# Patient Record
Sex: FEMALE | Race: WHITE | Employment: OTHER | URBAN - NONMETROPOLITAN AREA
[De-identification: names, ages, dates, MRNs, and addresses within clinical notes are randomized per-mention and may not be internally consistent; named-entity substitution may affect disease eponyms.]

---

## 2020-10-16 PROBLEM — J44.9 COPD (CHRONIC OBSTRUCTIVE PULMONARY DISEASE) (HCC): Status: ACTIVE | Noted: 2020-10-16

## 2020-10-16 PROBLEM — R73.09 HEMOGLOBIN A1C ABOVE REFERENCE RANGE: Status: ACTIVE | Noted: 2020-10-16

## 2020-10-16 PROBLEM — G25.0 ESSENTIAL TREMOR: Status: ACTIVE | Noted: 2020-10-16

## 2020-10-16 PROBLEM — R16.0 LIVER MASS: Status: ACTIVE | Noted: 2020-10-16

## 2020-10-16 PROBLEM — I10 HTN (HYPERTENSION): Status: ACTIVE | Noted: 2020-10-16

## 2020-10-16 PROBLEM — F32.A DEPRESSION: Status: ACTIVE | Noted: 2020-10-16

## 2020-10-16 PROBLEM — E78.5 HLD (HYPERLIPIDEMIA): Status: ACTIVE | Noted: 2020-10-16

## 2020-10-16 PROBLEM — Z79.899 OTHER LONG TERM (CURRENT) DRUG THERAPY: Status: ACTIVE | Noted: 2020-10-16

## 2020-10-16 PROBLEM — F41.9 ANXIETY: Status: ACTIVE | Noted: 2020-10-16

## 2020-10-16 PROBLEM — G43.109 OPHTHALMIC MIGRAINE: Status: ACTIVE | Noted: 2020-10-16

## 2020-10-16 PROBLEM — F17.200 NICOTINE DEPENDENCE: Status: ACTIVE | Noted: 2020-10-16

## 2021-08-17 PROBLEM — S32.592A CLOSED BILATERAL FRACTURE OF PUBIC RAMI (HCC): Status: ACTIVE | Noted: 2021-08-17

## 2021-08-17 PROBLEM — K92.2 GI BLEED: Status: ACTIVE | Noted: 2021-08-17

## 2021-08-17 PROBLEM — S32.591A CLOSED BILATERAL FRACTURE OF PUBIC RAMI (HCC): Status: ACTIVE | Noted: 2021-08-17

## 2021-08-17 PROBLEM — E87.6 HYPOKALEMIA: Status: ACTIVE | Noted: 2021-08-17

## 2021-08-17 PROBLEM — E87.1 HYPONATREMIA: Status: ACTIVE | Noted: 2021-08-17

## 2021-08-20 PROBLEM — R53.81 PHYSICAL DECONDITIONING: Status: ACTIVE | Noted: 2021-08-20

## 2021-08-20 PROBLEM — S72.144A CLOSED NONDISPLACED INTERTROCHANTERIC FRACTURE OF RIGHT FEMUR (HCC): Status: ACTIVE | Noted: 2021-08-20

## 2021-08-20 PROBLEM — R52 ACUTE PAIN: Status: ACTIVE | Noted: 2021-08-20

## 2021-08-20 PROBLEM — A04.72 C. DIFFICILE DIARRHEA: Status: ACTIVE | Noted: 2021-08-20

## 2021-08-20 PROBLEM — M84.48XA BILATERAL SACRAL INSUFFICIENCY FRACTURE: Status: ACTIVE | Noted: 2021-08-20

## 2021-08-20 PROBLEM — S72.141A INTERTROCHANTERIC FRACTURE OF RIGHT FEMUR, CLOSED, INITIAL ENCOUNTER (HCC): Status: ACTIVE | Noted: 2021-08-20

## 2021-10-06 PROBLEM — M54.50 LUMBAR PAIN: Status: ACTIVE | Noted: 2021-10-06

## 2021-10-06 PROBLEM — M47.817 LUMBOSACRAL SPONDYLOSIS WITHOUT MYELOPATHY: Status: ACTIVE | Noted: 2021-10-06

## 2021-10-06 PROBLEM — M47.816 LUMBAR SPONDYLOSIS: Status: ACTIVE | Noted: 2021-10-06

## 2021-10-06 PROBLEM — M51.369 DDD (DEGENERATIVE DISC DISEASE), LUMBAR: Status: ACTIVE | Noted: 2021-10-06

## 2021-10-06 PROBLEM — M51.36 DDD (DEGENERATIVE DISC DISEASE), LUMBAR: Status: ACTIVE | Noted: 2021-10-06

## 2022-01-13 PROBLEM — S32.10XA SACRAL FRACTURE (HCC): Status: ACTIVE | Noted: 2022-01-13

## 2024-01-26 PROBLEM — R47.81 SLURRED SPEECH: Status: ACTIVE | Noted: 2024-01-26

## 2024-01-26 PROBLEM — R44.3 HALLUCINATION: Status: ACTIVE | Noted: 2024-01-26

## 2024-01-26 PROBLEM — J18.9 COMMUNITY ACQUIRED PNEUMONIA: Status: ACTIVE | Noted: 2024-01-26

## 2024-01-26 PROBLEM — E86.0 DEHYDRATION: Status: ACTIVE | Noted: 2024-01-26

## 2024-02-25 PROBLEM — E86.0 DEHYDRATION: Status: RESOLVED | Noted: 2024-01-26 | Resolved: 2024-02-25

## 2024-08-16 PROBLEM — M25.551 ACUTE RIGHT HIP PAIN: Status: ACTIVE | Noted: 2024-08-16

## 2024-08-26 ENCOUNTER — HOSPITAL ENCOUNTER (OUTPATIENT)
Dept: PREADMISSION TESTING | Age: 74
Discharge: HOME | End: 2024-08-30
Attending: ORTHOPAEDIC SURGERY | Admitting: ORTHOPAEDIC SURGERY

## 2024-08-26 VITALS
WEIGHT: 141 LBS | SYSTOLIC BLOOD PRESSURE: 137 MMHG | TEMPERATURE: 97.4 F | OXYGEN SATURATION: 95 % | RESPIRATION RATE: 16 BRPM | DIASTOLIC BLOOD PRESSURE: 81 MMHG | HEIGHT: 63 IN | HEART RATE: 68 BPM | BODY MASS INDEX: 24.98 KG/M2

## 2024-08-26 RX ORDER — ACETAMINOPHEN/DIPHENHYDRAMINE 500MG-25MG
1 TABLET ORAL NIGHTLY PRN
COMMUNITY

## 2024-08-26 RX ORDER — NAPROXEN SODIUM 220 MG
220 TABLET ORAL DAILY PRN
COMMUNITY

## 2024-08-26 ASSESSMENT — PAIN DESCRIPTION - ORIENTATION: ORIENTATION: RIGHT

## 2024-08-26 ASSESSMENT — PAIN DESCRIPTION - PAIN TYPE: TYPE: CHRONIC PAIN

## 2024-08-26 ASSESSMENT — PAIN SCALES - GENERAL: PAINLEVEL_OUTOF10: 5

## 2024-08-26 ASSESSMENT — PAIN DESCRIPTION - LOCATION: LOCATION: HIP

## 2024-08-26 NOTE — PROGRESS NOTES
Patient arrived for PAT visit. Patient was cleared by her pcp, with the request of a second EKG. Patient stated that she had her 2nd EKG and was good. I called Humza Stout's office, spoke to her staff. Staff stated that it appeared to have been read by cardiology and it was okay to proceed. However the EKG was not final. I request Anesthesia review the completed EKG, they did and they saw a new Atrial futter vs atrial fib and requested cardiac clearance. Connie at Dr. Jarvis's office notified via .

## 2024-08-26 NOTE — PROGRESS NOTES
[facility]   Preadmission Testing    Name: Radha Gusman  : 1950  Patient Phone: [phone] (home) [phone] (work)    Procedure: Right ANNALEE   Date of Procedure: 24  Surgeon: Vic Jarvis MD    Ht:  63\"  Wt: 141#  Wt method: stated    Allergies:   Allergies   Allergen Reactions    Gabapentin Other (See Comments)     Has to have Neurotin name brand; reaction affects brain, auras    Aspirin Other (See Comments)     Burning stomach    Lovastatin Myalgia    Pravastatin Myalgia    Tricor [Fenofibrate] Myalgia    Spiriva Handihaler [Tiotropium Bromide Monohydrate]        Peanut allergy: No         Vitals:    24 1319   BP: 137/81   Pulse: 68   Resp: 16   Temp: 97.4 °F (36.3 °C)   SpO2: 95%       No LMP recorded.    Do you take blood thinners?   [x] Yes    [] No         Instructed to stop blood thinners prior to procedure?    [x] Yes    [] No      [] N/A   Do you have sleep apnea?   [] Yes    [x] No     Instructed to bring CPAP machine?   [] Yes    [] No    [x] N/A   Do you have acid reflux ?   [] Yes    [x] No     Do you have  hiatal hernia?   [] Yes    [x] No    Do you ever experience motion sickness?   [x] Yes    [] No     Have you had a respiratory infection or sore throat in last 4 weeks before surgery?    [x] Yes    [] No     Do you have poorly controlled asthma or COPD?   [] Yes    [x] No     Do you have a history of angina in the last month or symptomatic arrhythmia?   [] Yes    [x] No     Do you have significant central nervous system disease?   [x] Yes    [] No     Have you had an EKG, labs, or chest xray in last 12 months?  If yes provide copies to anesthesia   [x] Yes    [] No       [x] Lab    [x] EKG    [x] CXR     Have you had a stress test?     [] Yes    [x] No    When/where:    Was it normal?    [] Yes    [] No   Do you or your family have a history of Malignant Hyperthermia? [] Yes    [x] No     Patient instructed on:   [x] NPO Status   [x] Meds to Take Day of Surgery  [x]

## 2024-11-19 NOTE — PROGRESS NOTES
Patient was notified about her type and screen needing redrawn for her surgery on 11/21. The patient will be in [place] tomorrow so she will come 11/20 to have that done.

## 2024-11-20 ENCOUNTER — HOSPITAL ENCOUNTER (OUTPATIENT)
Age: 74
Discharge: HOME | End: 2024-11-20
Payer: MEDICARE

## 2024-11-20 ENCOUNTER — ANESTHESIA EVENT (OUTPATIENT)
Dept: OPERATING ROOM | Age: 74
End: 2024-11-20
Payer: MEDICARE

## 2024-11-20 LAB
ABO + RH BLD: NORMAL
ARM BAND NUMBER: NORMAL
BLOOD BANK SAMPLE EXPIRATION: NORMAL
BLOOD GROUP ANTIBODIES SERPL: NEGATIVE

## 2024-11-20 PROCEDURE — 36415 COLL VENOUS BLD VENIPUNCTURE: CPT

## 2024-11-20 PROCEDURE — 86850 RBC ANTIBODY SCREEN: CPT

## 2024-11-20 PROCEDURE — 86900 BLOOD TYPING SEROLOGIC ABO: CPT

## 2024-11-20 PROCEDURE — 86901 BLOOD TYPING SEROLOGIC RH(D): CPT

## 2024-11-21 ENCOUNTER — HOSPITAL ENCOUNTER (OUTPATIENT)
Age: 74
Setting detail: OBSERVATION
Discharge: HOME | End: 2024-11-22
Attending: ORTHOPAEDIC SURGERY | Admitting: ORTHOPAEDIC SURGERY
Payer: MEDICARE

## 2024-11-21 ENCOUNTER — APPOINTMENT (OUTPATIENT)
Dept: GENERAL RADIOLOGY | Age: 74
End: 2024-11-21
Attending: ORTHOPAEDIC SURGERY
Payer: MEDICARE

## 2024-11-21 ENCOUNTER — ANESTHESIA (OUTPATIENT)
Dept: OPERATING ROOM | Age: 74
End: 2024-11-21
Payer: MEDICARE

## 2024-11-21 DIAGNOSIS — Z01.818 PRE-OP TESTING: ICD-10-CM

## 2024-11-21 DIAGNOSIS — Z96.641 S/P TOTAL RIGHT HIP ARTHROPLASTY: Primary | ICD-10-CM

## 2024-11-21 PROCEDURE — 7100000001 HC PACU RECOVERY - ADDTL 15 MIN: Performed by: ORTHOPAEDIC SURGERY

## 2024-11-21 PROCEDURE — 3700000000 HC ANESTHESIA ATTENDED CARE: Performed by: ORTHOPAEDIC SURGERY

## 2024-11-21 PROCEDURE — 97162 PT EVAL MOD COMPLEX 30 MIN: CPT

## 2024-11-21 PROCEDURE — 2500000003 HC RX 250 WO HCPCS: Performed by: ORTHOPAEDIC SURGERY

## 2024-11-21 PROCEDURE — 94640 AIRWAY INHALATION TREATMENT: CPT

## 2024-11-21 PROCEDURE — 94150 VITAL CAPACITY TEST: CPT

## 2024-11-21 PROCEDURE — 2700000000 HC OXYGEN THERAPY PER DAY

## 2024-11-21 PROCEDURE — 6360000002 HC RX W HCPCS: Performed by: PHYSICIAN ASSISTANT

## 2024-11-21 PROCEDURE — 6370000000 HC RX 637 (ALT 250 FOR IP): Performed by: INTERNAL MEDICINE

## 2024-11-21 PROCEDURE — 3700000001 HC ADD 15 MINUTES (ANESTHESIA): Performed by: ORTHOPAEDIC SURGERY

## 2024-11-21 PROCEDURE — 3600000005 HC SURGERY LEVEL 5 BASE: Performed by: ORTHOPAEDIC SURGERY

## 2024-11-21 PROCEDURE — 2580000003 HC RX 258: Performed by: NURSE ANESTHETIST, CERTIFIED REGISTERED

## 2024-11-21 PROCEDURE — 2580000003 HC RX 258: Performed by: PHYSICIAN ASSISTANT

## 2024-11-21 PROCEDURE — 72170 X-RAY EXAM OF PELVIS: CPT

## 2024-11-21 PROCEDURE — 6360000002 HC RX W HCPCS: Performed by: NURSE ANESTHETIST, CERTIFIED REGISTERED

## 2024-11-21 PROCEDURE — C1776 JOINT DEVICE (IMPLANTABLE): HCPCS | Performed by: ORTHOPAEDIC SURGERY

## 2024-11-21 PROCEDURE — 6370000000 HC RX 637 (ALT 250 FOR IP): Performed by: NURSE ANESTHETIST, CERTIFIED REGISTERED

## 2024-11-21 PROCEDURE — 6360000002 HC RX W HCPCS

## 2024-11-21 PROCEDURE — 6370000000 HC RX 637 (ALT 250 FOR IP): Performed by: PHYSICIAN ASSISTANT

## 2024-11-21 PROCEDURE — 3600000015 HC SURGERY LEVEL 5 ADDTL 15MIN: Performed by: ORTHOPAEDIC SURGERY

## 2024-11-21 PROCEDURE — G0378 HOSPITAL OBSERVATION PER HR: HCPCS

## 2024-11-21 PROCEDURE — 2709999900 HC NON-CHARGEABLE SUPPLY: Performed by: ORTHOPAEDIC SURGERY

## 2024-11-21 PROCEDURE — 94761 N-INVAS EAR/PLS OXIMETRY MLT: CPT

## 2024-11-21 PROCEDURE — 97166 OT EVAL MOD COMPLEX 45 MIN: CPT

## 2024-11-21 PROCEDURE — 7100000000 HC PACU RECOVERY - FIRST 15 MIN: Performed by: ORTHOPAEDIC SURGERY

## 2024-11-21 PROCEDURE — 2500000003 HC RX 250 WO HCPCS: Performed by: NURSE ANESTHETIST, CERTIFIED REGISTERED

## 2024-11-21 PROCEDURE — 94664 DEMO&/EVAL PT USE INHALER: CPT

## 2024-11-21 DEVICE — BONE SCREW 6.5X25 SELF-TAP: Type: IMPLANTABLE DEVICE | Site: HIP | Status: FUNCTIONAL

## 2024-11-21 DEVICE — ECHO B-MTRC MP FP SO 13: Type: IMPLANTABLE DEVICE | Site: HIP | Status: FUNCTIONAL

## 2024-11-21 DEVICE — IMPLANTABLE DEVICE: Type: IMPLANTABLE DEVICE | Site: HIP | Status: FUNCTIONAL

## 2024-11-21 DEVICE — G7 PPS LTD ACET SHELL 48C: Type: IMPLANTABLE DEVICE | Site: HIP | Status: FUNCTIONAL

## 2024-11-21 DEVICE — LINER ACET C NEUT 32 MM HIP VIVACIT-E G7: Type: IMPLANTABLE DEVICE | Site: HIP | Status: FUNCTIONAL

## 2024-11-21 RX ORDER — AMLODIPINE BESYLATE 5 MG/1
5 TABLET ORAL DAILY
Status: DISCONTINUED | OUTPATIENT
Start: 2024-11-21 | End: 2024-11-22 | Stop reason: HOSPADM

## 2024-11-21 RX ORDER — BUPIVACAINE/KETOROLAC/KETAMINE 150-60/50
SYRINGE (ML) INJECTION
Status: DISPENSED
Start: 2024-11-21 | End: 2024-11-21

## 2024-11-21 RX ORDER — NALOXONE HYDROCHLORIDE 0.4 MG/ML
INJECTION, SOLUTION INTRAMUSCULAR; INTRAVENOUS; SUBCUTANEOUS PRN
Status: DISCONTINUED | OUTPATIENT
Start: 2024-11-21 | End: 2024-11-21 | Stop reason: HOSPADM

## 2024-11-21 RX ORDER — ATENOLOL 50 MG/1
50 TABLET ORAL NIGHTLY
Status: DISCONTINUED | OUTPATIENT
Start: 2024-11-21 | End: 2024-11-22 | Stop reason: HOSPADM

## 2024-11-21 RX ORDER — LORAZEPAM 1 MG/1
1 TABLET ORAL EVERY 6 HOURS PRN
Status: DISCONTINUED | OUTPATIENT
Start: 2024-11-21 | End: 2024-11-22 | Stop reason: HOSPADM

## 2024-11-21 RX ORDER — HYDROMORPHONE HYDROCHLORIDE 1 MG/ML
0.5 INJECTION, SOLUTION INTRAMUSCULAR; INTRAVENOUS; SUBCUTANEOUS ONCE
Status: COMPLETED | OUTPATIENT
Start: 2024-11-21 | End: 2024-11-21

## 2024-11-21 RX ORDER — CEFAZOLIN SODIUM/WATER 2 G/20 ML
2000 SYRINGE (ML) INTRAVENOUS
Status: COMPLETED | OUTPATIENT
Start: 2024-11-21 | End: 2024-11-21

## 2024-11-21 RX ORDER — SODIUM CHLORIDE 9 MG/ML
INJECTION, SOLUTION INTRAVENOUS CONTINUOUS
Status: DISCONTINUED | OUTPATIENT
Start: 2024-11-21 | End: 2024-11-21 | Stop reason: HOSPADM

## 2024-11-21 RX ORDER — POLYETHYLENE GLYCOL 3350 17 G/17G
17 POWDER, FOR SOLUTION ORAL DAILY
Status: DISCONTINUED | OUTPATIENT
Start: 2024-11-21 | End: 2024-11-22 | Stop reason: HOSPADM

## 2024-11-21 RX ORDER — SODIUM CHLORIDE 9 MG/ML
INJECTION, SOLUTION INTRAVENOUS PRN
Status: DISCONTINUED | OUTPATIENT
Start: 2024-11-21 | End: 2024-11-21 | Stop reason: HOSPADM

## 2024-11-21 RX ORDER — EPHEDRINE SULFATE/0.9% NACL/PF 25 MG/5 ML
SYRINGE (ML) INTRAVENOUS
Status: DISCONTINUED | OUTPATIENT
Start: 2024-11-21 | End: 2024-11-21 | Stop reason: SDUPTHER

## 2024-11-21 RX ORDER — BUPIVACAINE/KETOROLAC/KETAMINE 150-60/50
SYRINGE (ML) INJECTION PRN
Status: DISCONTINUED | OUTPATIENT
Start: 2024-11-21 | End: 2024-11-21 | Stop reason: ALTCHOICE

## 2024-11-21 RX ORDER — ONDANSETRON 2 MG/ML
4 INJECTION INTRAMUSCULAR; INTRAVENOUS
Status: DISCONTINUED | OUTPATIENT
Start: 2024-11-21 | End: 2024-11-21 | Stop reason: HOSPADM

## 2024-11-21 RX ORDER — SENNOSIDES A AND B 8.6 MG/1
1 TABLET, FILM COATED ORAL DAILY PRN
Status: DISCONTINUED | OUTPATIENT
Start: 2024-11-21 | End: 2024-11-22 | Stop reason: HOSPADM

## 2024-11-21 RX ORDER — SODIUM CHLORIDE 9 MG/ML
INJECTION, SOLUTION INTRAVENOUS CONTINUOUS
Status: DISCONTINUED | OUTPATIENT
Start: 2024-11-21 | End: 2024-11-22 | Stop reason: HOSPADM

## 2024-11-21 RX ORDER — PHENYLEPHRINE HYDROCHLORIDE 10 MG/ML
INJECTION INTRAVENOUS
Status: DISCONTINUED | OUTPATIENT
Start: 2024-11-21 | End: 2024-11-21 | Stop reason: SDUPTHER

## 2024-11-21 RX ORDER — ONDANSETRON 4 MG/1
4 TABLET, ORALLY DISINTEGRATING ORAL EVERY 8 HOURS PRN
Status: DISCONTINUED | OUTPATIENT
Start: 2024-11-21 | End: 2024-11-22 | Stop reason: HOSPADM

## 2024-11-21 RX ORDER — SODIUM CHLORIDE 0.9 % (FLUSH) 0.9 %
5-40 SYRINGE (ML) INJECTION EVERY 12 HOURS SCHEDULED
Status: DISCONTINUED | OUTPATIENT
Start: 2024-11-21 | End: 2024-11-21 | Stop reason: HOSPADM

## 2024-11-21 RX ORDER — DIPHENHYDRAMINE HCL 25 MG
25 CAPSULE ORAL EVERY 6 HOURS PRN
Status: DISCONTINUED | OUTPATIENT
Start: 2024-11-21 | End: 2024-11-22 | Stop reason: HOSPADM

## 2024-11-21 RX ORDER — TRANEXAMIC ACID 100 MG/ML
1000 INJECTION, SOLUTION INTRAVENOUS ONCE
Status: COMPLETED | OUTPATIENT
Start: 2024-11-21 | End: 2024-11-21

## 2024-11-21 RX ORDER — FENTANYL CITRATE 50 UG/ML
INJECTION, SOLUTION INTRAMUSCULAR; INTRAVENOUS
Status: DISCONTINUED | OUTPATIENT
Start: 2024-11-21 | End: 2024-11-21 | Stop reason: SDUPTHER

## 2024-11-21 RX ORDER — ATENOLOL 50 MG/1
100 TABLET ORAL DAILY
Status: DISCONTINUED | OUTPATIENT
Start: 2024-11-22 | End: 2024-11-22 | Stop reason: HOSPADM

## 2024-11-21 RX ORDER — DIMENHYDRINATE 50 MG
50 TABLET ORAL ONCE
Status: COMPLETED | OUTPATIENT
Start: 2024-11-21 | End: 2024-11-21

## 2024-11-21 RX ORDER — ONDANSETRON 2 MG/ML
4 INJECTION INTRAMUSCULAR; INTRAVENOUS EVERY 6 HOURS PRN
Status: DISCONTINUED | OUTPATIENT
Start: 2024-11-21 | End: 2024-11-22 | Stop reason: HOSPADM

## 2024-11-21 RX ORDER — ACETAMINOPHEN 325 MG/1
650 TABLET ORAL ONCE
Status: DISCONTINUED | OUTPATIENT
Start: 2024-11-21 | End: 2024-11-21 | Stop reason: HOSPADM

## 2024-11-21 RX ORDER — ALBUTEROL SULFATE 90 UG/1
2 INHALANT RESPIRATORY (INHALATION) EVERY 4 HOURS PRN
Status: DISCONTINUED | OUTPATIENT
Start: 2024-11-21 | End: 2024-11-22 | Stop reason: HOSPADM

## 2024-11-21 RX ORDER — TRANEXAMIC ACID 100 MG/ML
1000 INJECTION, SOLUTION INTRAVENOUS ONCE
Status: DISCONTINUED | OUTPATIENT
Start: 2024-11-21 | End: 2024-11-22 | Stop reason: HOSPADM

## 2024-11-21 RX ORDER — GABAPENTIN 300 MG/1
300 CAPSULE ORAL 4 TIMES DAILY
Status: DISCONTINUED | OUTPATIENT
Start: 2024-11-21 | End: 2024-11-22 | Stop reason: HOSPADM

## 2024-11-21 RX ORDER — ALBUTEROL SULFATE 0.83 MG/ML
2.5 SOLUTION RESPIRATORY (INHALATION) ONCE
Status: COMPLETED | OUTPATIENT
Start: 2024-11-21 | End: 2024-11-21

## 2024-11-21 RX ORDER — VENLAFAXINE HYDROCHLORIDE 150 MG/1
150 CAPSULE, EXTENDED RELEASE ORAL 2 TIMES DAILY
Status: DISCONTINUED | OUTPATIENT
Start: 2024-11-21 | End: 2024-11-22 | Stop reason: HOSPADM

## 2024-11-21 RX ORDER — POTASSIUM CHLORIDE 750 MG/1
10 TABLET, EXTENDED RELEASE ORAL DAILY
Status: DISCONTINUED | OUTPATIENT
Start: 2024-11-21 | End: 2024-11-22 | Stop reason: HOSPADM

## 2024-11-21 RX ORDER — BISACODYL 5 MG/1
5 TABLET, DELAYED RELEASE ORAL DAILY PRN
Status: DISCONTINUED | OUTPATIENT
Start: 2024-11-21 | End: 2024-11-22 | Stop reason: HOSPADM

## 2024-11-21 RX ORDER — SODIUM CHLORIDE 0.9 % (FLUSH) 0.9 %
5-40 SYRINGE (ML) INJECTION EVERY 12 HOURS SCHEDULED
Status: DISCONTINUED | OUTPATIENT
Start: 2024-11-21 | End: 2024-11-22 | Stop reason: HOSPADM

## 2024-11-21 RX ORDER — SODIUM CHLORIDE 0.9 % (FLUSH) 0.9 %
5-40 SYRINGE (ML) INJECTION PRN
Status: DISCONTINUED | OUTPATIENT
Start: 2024-11-21 | End: 2024-11-21 | Stop reason: HOSPADM

## 2024-11-21 RX ORDER — SODIUM CHLORIDE 9 MG/ML
INJECTION, SOLUTION INTRAVENOUS PRN
Status: DISCONTINUED | OUTPATIENT
Start: 2024-11-21 | End: 2024-11-22 | Stop reason: HOSPADM

## 2024-11-21 RX ORDER — DEXMEDETOMIDINE HYDROCHLORIDE 4 UG/ML
INJECTION, SOLUTION INTRAVENOUS
Status: DISCONTINUED | OUTPATIENT
Start: 2024-11-21 | End: 2024-11-21 | Stop reason: SDUPTHER

## 2024-11-21 RX ORDER — ALBUTEROL SULFATE 90 UG/1
2 INHALANT RESPIRATORY (INHALATION) EVERY 6 HOURS PRN
Status: DISCONTINUED | OUTPATIENT
Start: 2024-11-21 | End: 2024-11-21

## 2024-11-21 RX ORDER — ACETAMINOPHEN 325 MG/1
650 TABLET ORAL EVERY 6 HOURS
Status: DISCONTINUED | OUTPATIENT
Start: 2024-11-21 | End: 2024-11-22 | Stop reason: HOSPADM

## 2024-11-21 RX ORDER — ACETAMINOPHEN 500 MG
1000 TABLET ORAL ONCE
Status: COMPLETED | OUTPATIENT
Start: 2024-11-21 | End: 2024-11-21

## 2024-11-21 RX ORDER — ALBUTEROL SULFATE 90 UG/1
2 INHALANT RESPIRATORY (INHALATION)
Status: DISCONTINUED | OUTPATIENT
Start: 2024-11-21 | End: 2024-11-22 | Stop reason: HOSPADM

## 2024-11-21 RX ORDER — FENTANYL CITRATE 50 UG/ML
50 INJECTION, SOLUTION INTRAMUSCULAR; INTRAVENOUS EVERY 5 MIN PRN
Status: COMPLETED | OUTPATIENT
Start: 2024-11-21 | End: 2024-11-21

## 2024-11-21 RX ORDER — OXYCODONE HYDROCHLORIDE 5 MG/1
5 TABLET ORAL EVERY 4 HOURS PRN
Status: DISCONTINUED | OUTPATIENT
Start: 2024-11-21 | End: 2024-11-22 | Stop reason: HOSPADM

## 2024-11-21 RX ORDER — CEFAZOLIN SODIUM/WATER 2 G/20 ML
2000 SYRINGE (ML) INTRAVENOUS EVERY 8 HOURS
Status: COMPLETED | OUTPATIENT
Start: 2024-11-21 | End: 2024-11-22

## 2024-11-21 RX ORDER — DEXAMETHASONE SODIUM PHOSPHATE 4 MG/ML
INJECTION, SOLUTION INTRA-ARTICULAR; INTRALESIONAL; INTRAMUSCULAR; INTRAVENOUS; SOFT TISSUE
Status: DISCONTINUED | OUTPATIENT
Start: 2024-11-21 | End: 2024-11-21 | Stop reason: SDUPTHER

## 2024-11-21 RX ORDER — LIDOCAINE HYDROCHLORIDE 20 MG/ML
INJECTION, SOLUTION EPIDURAL; INFILTRATION; INTRACAUDAL; PERINEURAL
Status: DISCONTINUED | OUTPATIENT
Start: 2024-11-21 | End: 2024-11-21 | Stop reason: SDUPTHER

## 2024-11-21 RX ORDER — OXYCODONE HYDROCHLORIDE 5 MG/1
5 TABLET ORAL EVERY 6 HOURS PRN
Qty: 20 TABLET | Refills: 0 | Status: SHIPPED | OUTPATIENT
Start: 2024-11-21 | End: 2024-11-26

## 2024-11-21 RX ORDER — ROCURONIUM BROMIDE 10 MG/ML
INJECTION, SOLUTION INTRAVENOUS
Status: DISCONTINUED | OUTPATIENT
Start: 2024-11-21 | End: 2024-11-21 | Stop reason: SDUPTHER

## 2024-11-21 RX ORDER — EZETIMIBE 10 MG/1
10 TABLET ORAL NIGHTLY
Status: DISCONTINUED | OUTPATIENT
Start: 2024-11-21 | End: 2024-11-22 | Stop reason: HOSPADM

## 2024-11-21 RX ORDER — SODIUM CHLORIDE 0.9 % (FLUSH) 0.9 %
5-40 SYRINGE (ML) INJECTION PRN
Status: DISCONTINUED | OUTPATIENT
Start: 2024-11-21 | End: 2024-11-22 | Stop reason: HOSPADM

## 2024-11-21 RX ORDER — DIPHENHYDRAMINE HYDROCHLORIDE 50 MG/ML
25 INJECTION INTRAMUSCULAR; INTRAVENOUS EVERY 6 HOURS PRN
Status: DISCONTINUED | OUTPATIENT
Start: 2024-11-21 | End: 2024-11-22 | Stop reason: HOSPADM

## 2024-11-21 RX ORDER — OXYCODONE HYDROCHLORIDE 5 MG/1
10 TABLET ORAL EVERY 4 HOURS PRN
Status: DISCONTINUED | OUTPATIENT
Start: 2024-11-21 | End: 2024-11-22 | Stop reason: HOSPADM

## 2024-11-21 RX ORDER — ONDANSETRON 2 MG/ML
INJECTION INTRAMUSCULAR; INTRAVENOUS
Status: DISCONTINUED | OUTPATIENT
Start: 2024-11-21 | End: 2024-11-21 | Stop reason: SDUPTHER

## 2024-11-21 RX ORDER — PROPOFOL 10 MG/ML
INJECTION, EMULSION INTRAVENOUS
Status: DISCONTINUED | OUTPATIENT
Start: 2024-11-21 | End: 2024-11-21 | Stop reason: SDUPTHER

## 2024-11-21 RX ORDER — FENTANYL CITRATE 50 UG/ML
25 INJECTION, SOLUTION INTRAMUSCULAR; INTRAVENOUS EVERY 5 MIN PRN
Status: DISCONTINUED | OUTPATIENT
Start: 2024-11-21 | End: 2024-11-21 | Stop reason: HOSPADM

## 2024-11-21 RX ORDER — KETOROLAC TROMETHAMINE 15 MG/ML
15 INJECTION, SOLUTION INTRAMUSCULAR; INTRAVENOUS ONCE
Status: COMPLETED | OUTPATIENT
Start: 2024-11-21 | End: 2024-11-21

## 2024-11-21 RX ADMIN — ROCURONIUM BROMIDE 10 MG: 10 INJECTION, SOLUTION INTRAVENOUS at 08:02

## 2024-11-21 RX ADMIN — KETOROLAC TROMETHAMINE 15 MG: 15 INJECTION, SOLUTION INTRAMUSCULAR; INTRAVENOUS at 10:47

## 2024-11-21 RX ADMIN — SODIUM CHLORIDE: 9 INJECTION, SOLUTION INTRAVENOUS at 13:17

## 2024-11-21 RX ADMIN — LIDOCAINE HYDROCHLORIDE 80 MG: 20 INJECTION, SOLUTION EPIDURAL; INFILTRATION; INTRACAUDAL; PERINEURAL at 07:41

## 2024-11-21 RX ADMIN — DEXMEDETOMIDINE HYDROCHLORIDE IN 0.9% SODIUM CHLORIDE 2 MCG: 4 INJECTION INTRAVENOUS at 09:05

## 2024-11-21 RX ADMIN — LISINOPRIL 30 MG: 20 TABLET ORAL at 13:01

## 2024-11-21 RX ADMIN — ATENOLOL 50 MG: 50 TABLET ORAL at 20:07

## 2024-11-21 RX ADMIN — SUGAMMADEX 150 MG: 100 INJECTION, SOLUTION INTRAVENOUS at 09:50

## 2024-11-21 RX ADMIN — PROPOFOL 110 MG: 10 INJECTION, EMULSION INTRAVENOUS at 07:41

## 2024-11-21 RX ADMIN — PHENYLEPHRINE HYDROCHLORIDE 100 MCG: 10 INJECTION INTRAVENOUS at 09:39

## 2024-11-21 RX ADMIN — VENLAFAXINE HYDROCHLORIDE 150 MG: 150 CAPSULE, EXTENDED RELEASE ORAL at 20:07

## 2024-11-21 RX ADMIN — EZETIMIBE 10 MG: 10 TABLET ORAL at 20:07

## 2024-11-21 RX ADMIN — FENTANYL CITRATE 25 MCG: 0.05 INJECTION, SOLUTION INTRAMUSCULAR; INTRAVENOUS at 10:51

## 2024-11-21 RX ADMIN — ROCURONIUM BROMIDE 10 MG: 10 INJECTION, SOLUTION INTRAVENOUS at 08:13

## 2024-11-21 RX ADMIN — OXYCODONE HYDROCHLORIDE 10 MG: 5 TABLET ORAL at 23:08

## 2024-11-21 RX ADMIN — PHENYLEPHRINE HYDROCHLORIDE 100 MCG: 10 INJECTION INTRAVENOUS at 08:44

## 2024-11-21 RX ADMIN — HYDROMORPHONE HYDROCHLORIDE 0.5 MG: 1 INJECTION, SOLUTION INTRAMUSCULAR; INTRAVENOUS; SUBCUTANEOUS at 11:11

## 2024-11-21 RX ADMIN — Medication 1000 MG: at 07:57

## 2024-11-21 RX ADMIN — ROCURONIUM BROMIDE 30 MG: 10 INJECTION, SOLUTION INTRAVENOUS at 07:41

## 2024-11-21 RX ADMIN — ALBUTEROL SULFATE 2.5 MG: 0.83 SOLUTION RESPIRATORY (INHALATION) at 07:20

## 2024-11-21 RX ADMIN — FENTANYL CITRATE 50 MCG: 50 INJECTION INTRAMUSCULAR; INTRAVENOUS at 07:41

## 2024-11-21 RX ADMIN — AMLODIPINE BESYLATE 5 MG: 5 TABLET ORAL at 13:01

## 2024-11-21 RX ADMIN — DEXAMETHASONE SODIUM PHOSPHATE 4 MG: 4 INJECTION INTRA-ARTICULAR; INTRALESIONAL; INTRAMUSCULAR; INTRAVENOUS; SOFT TISSUE at 07:59

## 2024-11-21 RX ADMIN — FENTANYL CITRATE 50 MCG: 50 INJECTION INTRAMUSCULAR; INTRAVENOUS at 08:25

## 2024-11-21 RX ADMIN — DEXMEDETOMIDINE HYDROCHLORIDE IN 0.9% SODIUM CHLORIDE 2 MCG: 4 INJECTION INTRAVENOUS at 08:30

## 2024-11-21 RX ADMIN — PHENYLEPHRINE HYDROCHLORIDE 100 MCG: 10 INJECTION INTRAVENOUS at 08:00

## 2024-11-21 RX ADMIN — ROCURONIUM BROMIDE 10 MG: 10 INJECTION, SOLUTION INTRAVENOUS at 09:10

## 2024-11-21 RX ADMIN — PHENYLEPHRINE HYDROCHLORIDE 100 MCG: 10 INJECTION INTRAVENOUS at 08:40

## 2024-11-21 RX ADMIN — EPHEDRINE SULFATE 5 MG: 5 INJECTION INTRAVENOUS at 08:17

## 2024-11-21 RX ADMIN — ACETAMINOPHEN 1000 MG: 500 TABLET ORAL at 06:53

## 2024-11-21 RX ADMIN — EPHEDRINE SULFATE 10 MG: 5 INJECTION INTRAVENOUS at 07:56

## 2024-11-21 RX ADMIN — DEXMEDETOMIDINE HYDROCHLORIDE IN 0.9% SODIUM CHLORIDE 2 MCG: 4 INJECTION INTRAVENOUS at 08:56

## 2024-11-21 RX ADMIN — ALBUTEROL SULFATE 2 PUFF: 90 AEROSOL, METERED RESPIRATORY (INHALATION) at 16:26

## 2024-11-21 RX ADMIN — POTASSIUM CHLORIDE 10 MEQ: 750 TABLET, EXTENDED RELEASE ORAL at 13:01

## 2024-11-21 RX ADMIN — POLYETHYLENE GLYCOL 3350 17 G: 17 POWDER, FOR SOLUTION ORAL at 13:00

## 2024-11-21 RX ADMIN — PHENYLEPHRINE HYDROCHLORIDE 100 MCG: 10 INJECTION INTRAVENOUS at 09:31

## 2024-11-21 RX ADMIN — PHENYLEPHRINE HYDROCHLORIDE 100 MCG: 10 INJECTION INTRAVENOUS at 09:22

## 2024-11-21 RX ADMIN — TRANEXAMIC ACID 1000 MG: 100 INJECTION, SOLUTION INTRAVENOUS at 09:29

## 2024-11-21 RX ADMIN — GABAPENTIN 300 MG: 300 CAPSULE ORAL at 16:39

## 2024-11-21 RX ADMIN — ACETAMINOPHEN 650 MG: 325 TABLET ORAL at 13:01

## 2024-11-21 RX ADMIN — OXYCODONE HYDROCHLORIDE 10 MG: 5 TABLET ORAL at 13:01

## 2024-11-21 RX ADMIN — SODIUM CHLORIDE: 9 INJECTION, SOLUTION INTRAVENOUS at 09:12

## 2024-11-21 RX ADMIN — ROCURONIUM BROMIDE 10 MG: 10 INJECTION, SOLUTION INTRAVENOUS at 08:34

## 2024-11-21 RX ADMIN — FENTANYL CITRATE 50 MCG: 0.05 INJECTION, SOLUTION INTRAMUSCULAR; INTRAVENOUS at 10:18

## 2024-11-21 RX ADMIN — ALBUTEROL SULFATE 2 PUFF: 90 AEROSOL, METERED RESPIRATORY (INHALATION) at 20:25

## 2024-11-21 RX ADMIN — PHENYLEPHRINE HYDROCHLORIDE 100 MCG: 10 INJECTION INTRAVENOUS at 08:17

## 2024-11-21 RX ADMIN — FENTANYL CITRATE 50 MCG: 0.05 INJECTION, SOLUTION INTRAMUSCULAR; INTRAVENOUS at 10:28

## 2024-11-21 RX ADMIN — DEXMEDETOMIDINE HYDROCHLORIDE IN 0.9% SODIUM CHLORIDE 4 MCG: 4 INJECTION INTRAVENOUS at 09:51

## 2024-11-21 RX ADMIN — SODIUM CHLORIDE: 9 INJECTION, SOLUTION INTRAVENOUS at 07:34

## 2024-11-21 RX ADMIN — GABAPENTIN 300 MG: 300 CAPSULE ORAL at 20:09

## 2024-11-21 RX ADMIN — ACETAMINOPHEN 650 MG: 325 TABLET ORAL at 20:07

## 2024-11-21 RX ADMIN — EPHEDRINE SULFATE 10 MG: 5 INJECTION INTRAVENOUS at 07:59

## 2024-11-21 RX ADMIN — DIMENHYDRINATE 50 MG: 50 TABLET ORAL at 06:53

## 2024-11-21 RX ADMIN — APIXABAN 2.5 MG: 2.5 TABLET, FILM COATED ORAL at 22:59

## 2024-11-21 RX ADMIN — GABAPENTIN 300 MG: 300 CAPSULE ORAL at 13:03

## 2024-11-21 RX ADMIN — Medication 2000 MG: at 16:38

## 2024-11-21 RX ADMIN — Medication 2000 MG: at 23:00

## 2024-11-21 RX ADMIN — TRANEXAMIC ACID 1000 MG: 100 INJECTION, SOLUTION INTRAVENOUS at 07:49

## 2024-11-21 RX ADMIN — ONDANSETRON 4 MG: 2 INJECTION, SOLUTION INTRAMUSCULAR; INTRAVENOUS at 09:31

## 2024-11-21 ASSESSMENT — PAIN DESCRIPTION - LOCATION
LOCATION: HIP

## 2024-11-21 ASSESSMENT — PAIN DESCRIPTION - FREQUENCY
FREQUENCY: CONTINUOUS

## 2024-11-21 ASSESSMENT — PAIN DESCRIPTION - DESCRIPTORS
DESCRIPTORS: SHARP
DESCRIPTORS: ACHING
DESCRIPTORS: SHARP
DESCRIPTORS: ACHING;DISCOMFORT
DESCRIPTORS: STABBING
DESCRIPTORS: ACHING;SHOOTING
DESCRIPTORS: ACHING
DESCRIPTORS: ACHING;SHOOTING

## 2024-11-21 ASSESSMENT — PAIN SCALES - GENERAL
PAINLEVEL_OUTOF10: 4
PAINLEVEL_OUTOF10: 10
PAINLEVEL_OUTOF10: 0
PAINLEVEL_OUTOF10: 10
PAINLEVEL_OUTOF10: 8
PAINLEVEL_OUTOF10: 0
PAINLEVEL_OUTOF10: 10
PAINLEVEL_OUTOF10: 7
PAINLEVEL_OUTOF10: 10
PAINLEVEL_OUTOF10: 10
PAINLEVEL_OUTOF10: 7
PAINLEVEL_OUTOF10: 10
PAINLEVEL_OUTOF10: 10

## 2024-11-21 ASSESSMENT — PAIN DESCRIPTION - ORIENTATION
ORIENTATION: RIGHT

## 2024-11-21 ASSESSMENT — PAIN DESCRIPTION - PAIN TYPE
TYPE: SURGICAL PAIN
TYPE: SURGICAL PAIN
TYPE: CHRONIC PAIN
TYPE: SURGICAL PAIN

## 2024-11-21 ASSESSMENT — PAIN - FUNCTIONAL ASSESSMENT
PAIN_FUNCTIONAL_ASSESSMENT: ACTIVITIES ARE NOT PREVENTED
PAIN_FUNCTIONAL_ASSESSMENT: PREVENTS OR INTERFERES SOME ACTIVE ACTIVITIES AND ADLS
PAIN_FUNCTIONAL_ASSESSMENT: PREVENTS OR INTERFERES SOME ACTIVE ACTIVITIES AND ADLS
PAIN_FUNCTIONAL_ASSESSMENT: 0-10
PAIN_FUNCTIONAL_ASSESSMENT: ACTIVITIES ARE NOT PREVENTED
PAIN_FUNCTIONAL_ASSESSMENT: 0-10
PAIN_FUNCTIONAL_ASSESSMENT: FACE, LEGS, ACTIVITY, CRY, AND CONSOLABILITY (FLACC)
PAIN_FUNCTIONAL_ASSESSMENT: PREVENTS OR INTERFERES SOME ACTIVE ACTIVITIES AND ADLS
PAIN_FUNCTIONAL_ASSESSMENT: 0-10
PAIN_FUNCTIONAL_ASSESSMENT: 0-10
PAIN_FUNCTIONAL_ASSESSMENT: FACE, LEGS, ACTIVITY, CRY, AND CONSOLABILITY (FLACC)
PAIN_FUNCTIONAL_ASSESSMENT: 0-10
PAIN_FUNCTIONAL_ASSESSMENT: ACTIVITIES ARE NOT PREVENTED
PAIN_FUNCTIONAL_ASSESSMENT: 0-10

## 2024-11-21 ASSESSMENT — PAIN DESCRIPTION - ONSET
ONSET: ON-GOING

## 2024-11-21 ASSESSMENT — LIFESTYLE VARIABLES: SMOKING_STATUS: 1

## 2024-11-21 ASSESSMENT — COPD QUESTIONNAIRES: CAT_SEVERITY: MILD

## 2024-11-21 NOTE — OP NOTE
Department of Orthopaedic Surgery  Operative Report      Patient:  Radha Gusman    YOB: 1950    MRN:  [id number]    Date of Surgery:  11/21/2024    Pre-operative Diagnosis:    1) Right hip osteoarthritis  2) Right hip pain     Post-operative Diagnosis:    1) Right hip osteoarthritis  2) Right hip pain     Procedure: Right total hip arthroplasty     Surgeon:  Vic Jarvis MD    Assistant(s):   First Assistant: Hui Sloan PA-C, assisted throughout the procedure with positioning, draping, retraction, wound closure, and dressing application.    Anesthesia: General     Estimated blood loss:  550 ml     Specimens:  Femoral head and acetabulum reamings disposed of     Fluids: 1500 ml crystalloid     Findings:  Bone on bone osteoarthritis, central wear.     Complications: None     Condition:  Stable     Indications for Surgery:  Patient is a 73 y/o female who presented with continued right hip pain with ambulation despite nonoperative interventions. Pain was affecting her quality of life and ADLs. Radiographs demonstrated end-stage osteoarthritis of the right hip. Given patient's continued pain, discussion was had with patient regarding total hip arthroplasty.  Discussed procedure, risks, benefits, and alternatives including but not limited to bleeding, infection, neurovascular injury, hardware failure, dislocation, leg length discrepancy, fracture, continued pain, DVT, need for additional surgery and risk of anesthesia. Patient understood the risks and consented to proceed with surgery.       Implants:    Biomet Echo micro 13 standard offset femur  48 mm G7 acetabular shell  Neutral liner  32mm x -3mm head ceramic  6.5 mm screw x2     Procedure:  Patient was identified and greeted in the preoperative holding area. The correct surgical site was identified and marked. Surgical consent was obtained and placed on the chart. Patient was taken to the surgical suite and transferred to the operating room table.

## 2024-11-21 NOTE — DISCHARGE INSTRUCTIONS
Total Joint Replacement  Discharge Instructions    To prevent Clot formation, you have been placed on an anticoagulant  Surgical Site Care:  Dressing to remain clean, dry, and intact until follow up in office in 2 weeks  Sutures may be present and will likely be removed at 2 week follow up   Showering is permitted over dressing as long as dressing remains fully intact. Do no shower if dressing is peeling or compromised  Physical Therapy:  Full Weight Bearing Status  Precautions   Per Physical Therapy handout  Pain Medications  You were given pain medication  Wean off pain medications as you deem appropriate as long as pain is under control  Cold packs/Ice packs/Machine  May be used 3 times daily for 15-30 minutes as necessary  Be sure to have a barrier (cloth, clothing, towel) between the site and the ice pack to prevent frostbite  Contact [facility] office if  Increased redness, swelling, drainage of any kind, and/or pain to surgery site.  As well as new onset fevers and or chills.  These could signify an infection.  Calf or thigh tenderness to touch as well as increased swelling or redness.  This could signify a clot formation.  Numbness or tingling to an area around the incision site or below the incision site (toes).  Any rash appears, increased  or new onset nausea/vomiting occur.  This may indicate a reaction to a medication.   Phone # [phone] - [facility]  Follow up with Surgeon at scheduled appointment time.

## 2024-11-21 NOTE — PROGRESS NOTES
Pt resting more comfortably after second dose of IV fentanyl but when she wakes up, she states pain is unchanged, 10/10, appears uncomfortable. Spoke to KIM Rausch who states we can try 15mg IV toradol and another 25mcg IV fentanyl.

## 2024-11-21 NOTE — PROGRESS NOTES
Pt continues to state pain unchanged, 10/10, still appears uncomfortable, Miracle, CRNA at bedside to discuss, orders 0.5mg IV dilaudid.

## 2024-11-21 NOTE — PROGRESS NOTES
Physical Therapy  Facility/Department: [facility]  Physical Therapy Initial Assessment    Name: Radha Gusman  : 1950  MRN: [id number]  Date of Service: 2024    Discharge Recommendations:  Continue to assess pending progress, Outpatient PT, Home with assist PRN          Patient Diagnosis(es): The primary encounter diagnosis was S/P total right hip arthroplasty. A diagnosis of Pre-op testing was also pertinent to this visit.  Past Medical History:  has a past medical history of Anxiety, C. difficile diarrhea, COPD (chronic obstructive pulmonary disease) (HCC), Depression, Essential tremor, Hemoglobin A1c above reference range, HLD (hyperlipidemia), Hypertension, Liver mass, Nicotine dependence, and Ophthalmic migraine.  Past Surgical History:  has a past surgical history that includes Breast surgery; Appendectomy; Hysterectomy, vaginal; Pain management procedure (Left, 2021); and Total hip arthroplasty (Right, 2024).    Assessment  Assessment: Patient is 74 year old female s/p R ANNALEE who presents with decreased R LE strength, decreased functional mobility and endurance and decreased safety and balance during transfers and ambulation and would benefit from physical therapy to address all concerns and safely return to OF.  Treatment Diagnosis: Difficulty walking  Specific Instructions for Next Treatment: Once daily on weekends  Therapy Prognosis: Good  Decision Making: Medium Complexity  Requires PT Follow-Up: Yes  Activity Tolerance  Activity Tolerance: Patient tolerated evaluation without incident    Plan  Physical Therapy Plan  General Plan: 2 times a day 7 days a week  Specific Instructions for Next Treatment: Once daily on weekends  Current Treatment Recommendations: Strengthening, ROM, Balance training, Functional mobility training, Transfer training, Neuromuscular re-education, Gait training, Stair training, Home exercise program, Safety education & training, Patient/Caregiver

## 2024-11-21 NOTE — PROGRESS NOTES
RESPIRATORY ASSESSMENT PROTOCOL                                                                                              Patient Name: Radha Gusman Room#: 0329/0329-01 : 1950     Admitting diagnosis: Right hip pain [M25.551]  S/P total right hip arthroplasty [Z96.641]       Medical History:   Past Medical History:   Diagnosis Date    Anxiety     C. difficile diarrhea 2021    bloody diarrhea-Vanco 125 mg PO QID    COPD (chronic obstructive pulmonary disease) (HCC)     Depression     Essential tremor     Hemoglobin A1c above reference range     HLD (hyperlipidemia)     Hypertension     Liver mass     Nicotine dependence     Ophthalmic migraine        PATIENT ASSESSMENT    LABORATORY DATA  Hematology:   Lab Results   Component Value Date/Time    WBC 11.8 2024 02:25 PM    RBC 4.83 2024 02:25 PM    RBC 3.82 2024 05:10 AM    HGB 14.8 2024 02:25 PM    HCT 45.1 2024 02:25 PM     2024 02:25 PM     Chemistry:  No results found for: \"PHART\", \"LVF9XOR\", \"PO2ART\", \"E0JWQAYU\", \"LCG5TWP\", \"PBEA\", \"NBEA\"    VITALS  Pulse: 68   Respirations: 18  BP: (!) 127/59  SpO2: 96 % O2 Device: Nasal cannula  Temp: 97.4 °F (36.3 °C)    SKIN COLOR  [x] Normal  [] Pale  [] Dusky  [] Cyanotic    RESPIRATORY PATTERN  [x] Normal  [] Dyspnea  [] Cheyne-Wren  [] Kussmaul  [] Biots    AMBULATORY  [] Yes  [] No  [x] With Assistance    PEAK FLOW  Predicted:     Personal Best:            Patient Acuity 0 1 2 3 4 Score   Level of Consciousness (LOC) [x]  Alert & Oriented or Pt normal LOC []  Confused;follows directions []  Confused & uncooper-ative []  Obtunded []  Comatose 0   Respiratory Rate  (RR) [x]  Reg. rate & pattern. 12 - 20 bpm  []  Increased RR. Greater than 20 bpm   []  SOB w/ exertion or RR greater than 24 bpm []  Access- ory muscle use at rest. Abn.  resp. []  SOB at rest.   0   Bilateral Breath Sounds (BBS) []  Clear []  Diminish-ed bases  [x]  Diminish-ed t/o, or rales    MP.  Notify physician if condition deteriorates. MDI THERAPY with  2 actuations of [physician-ordered bronchodilator(s)] via spacer TID Albuterol and PRN q4 hrs.   If unable to utilize MDI: HHN [physician-ordered bronchodilator(s)] TID and Albuterol PRN q4 hrs.   Notify physician if condition deteriorates. MDI THERAPY with  [physician-ordered bronchodilator(s)] via spacer TID PRN.   If unable to utilize MDI: HHN [physician-ordered bronchodilator(s)] TID PRN.   Notify physician if condition deteriorates.         If Acuity Level is 2, 3, or 4 in any of the following:    [] COUGH     [] SURGICAL HISTORY (SURG HX)  [] CHEST XRAY (CXR)    Goal: Improvement in sputum mobilization in patients with ineffective airway clearance. Reverse atelectasis.    [] Bronchopulmonary Hygiene Protocol      Total Acuity:   14-28  []  Secondary Assessment in 24 hrs Total Acuity:  9-13  []  Secondary Assessment in 24 hrs Total Acuity:  4-8  []  Secondary Assessment in 24 hrs Total Acuity:  0-3  []  Secondary Assessment in 48 hrs   METANEB QID with [physician-ordered bronchodilator(s)] if CXR Acuity = 4; otherwise:  PD&P, Oscillatory Therapy, or Vest QID & PRN AND PEP QID & PRN  NT Sxn PRN for ineffective cough  METANEB QID with [physician-ordered bronchodilator(s)] if CXR Acuity = 4; otherwise:  PD&P, Oscillatory Therapy or Vest QID & PRN AND PEP QID & PRN  NT Sxn PRN for ineffective cough  PD&P, Oscillatory Therapy, or Vest TID & PRN AND PEP TID & PRN   Instruct patient to self-perform IS q1hr WA       If Acuity Level is 2 or above in the following:    [] PULMONARY HISTORY (PULM HX)    Goal: Assist patient in quitting smoking to slow or stop the progression of lung disease.    [] Smoking Cessation Protocol    SMOKING CESSATION EDUCATION provided according to policy RT_201: (luly with an X)  ____Yes    ____ No     ____ NA    Smoking Cessation Booklet given:  ____Yes  ____No ____Patient Refused

## 2024-11-21 NOTE — PROGRESS NOTES
Pt resting more comfortably and able to doze off to sleep occasionally but when she wakes she states pain still not improved, \"maybe down to a 9 1/2.\" [facility] and they will be able to start PO pain medication, as we have exhausted all other options in PACU without success. [facility], report given to KURT De.    Discharge Criteria    Inpatients must meet Criteria 1 through 7. All other patients are either YES or N/A. If a NO is chosen then Anesthesia or Surgeon must be notified.      1.  Minimum 30 minutes after last dose of sedative medication.    Yes      2.  Systolic BP between 90 - 160. Diastolic BP between 60 - 90.    Yes      3.  Pulse between 60 - 120    Yes      4.  Respirations between 8 - 25.    Yes      5.  SpO2 92% - 100%.    Yes, on 3L NC.      6.  Able to cough and swallow or return to baseline function.    Yes      7.  Alert and oriented or return to baseline mental status.    Yes

## 2024-11-21 NOTE — PROGRESS NOTES
Occupational Therapy  Facility/Department: [facility] MED SURG  Occupational Therapy Initial Assessment    Name: Radha Gusman  : 1950  MRN: [id number]  Date of Service: 2024    Discharge Recommendations:  Continue to assess pending progress          Patient Diagnosis(es): The primary encounter diagnosis was S/P total right hip arthroplasty. A diagnosis of Pre-op testing was also pertinent to this visit.  Past Medical History:  has a past medical history of Anxiety, C. difficile diarrhea, COPD (chronic obstructive pulmonary disease) (HCC), Depression, Essential tremor, Hemoglobin A1c above reference range, HLD (hyperlipidemia), Hypertension, Liver mass, Nicotine dependence, and Ophthalmic migraine.  Past Surgical History:  has a past surgical history that includes Breast surgery; Appendectomy; Hysterectomy, vaginal; Pain management procedure (Left, 2021); and Total hip arthroplasty (Right, 2024).    Treatment Diagnosis: Weakness      Assessment  Performance deficits / Impairments: Decreased functional mobility ;Decreased high-level IADLs;Decreased ADL status;Decreased coordination;Decreased endurance;Decreased balance;Decreased strength  Assessment: 75 y/o F admitted to T for elective R ANNALEE resulting in incresed need for assist during ADL. Patient would benefit from OT services to address and edu on AE/DME to ensure safe and indep return home.  Treatment Diagnosis: Weakness  Prognosis: Good  Decision Making: Medium Complexity  REQUIRES OT FOLLOW-UP: Yes     Plan  Occupational Therapy Plan  Times Per Day: Once a day  Days Per Week: 7 Days  Current Treatment Recommendations: Strengthening, Balance training, Functional mobility training, Endurance training, Safety education & training, Patient/Caregiver education & training, Equipment evaluation, education, & procurement, Self-Care / ADL    Restrictions  Restrictions/Precautions  Restrictions/Precautions: Fall Risk, General Precautions, Weight

## 2024-11-21 NOTE — CONSULTS
Hospitalist Consult Note      Requesting Physician:  Dr. Jarvis    Reason for consultation: medical management    SUBJECTIVE:    History of Present Illness:   The patient is a 74 y.o. female who underwent an elective right total Hip replacement by  Dr. Jarvis  for degenerative arthritis and pain which was uncontrolled with outpatient conservative management.  Post-op course thus far has been uncomplicated.   Her pain is well controlled post operatively.  She denies nausea and vomiting post op.  She denies any chest pain, palpitations or shortness of breath.    Past Medical History:        Diagnosis Date    Anxiety     C. difficile diarrhea 08/17/2021    bloody diarrhea-Vanco 125 mg PO QID    COPD (chronic obstructive pulmonary disease) (HCC)     Depression     Essential tremor     Hemoglobin A1c above reference range     HLD (hyperlipidemia)     Hypertension     Liver mass     Nicotine dependence     Ophthalmic migraine        Past Surgical History:        Procedure Laterality Date    APPENDECTOMY      BREAST SURGERY      left breast lumpectomy    HYSTERECTOMY, VAGINAL      PAIN MANAGEMENT PROCEDURE Left 12/01/2021    LEFT TFESI L3 L4 L4 L5 WITH IV SEDATION performed by Darnell P[facility]ardo [facility] at [facility]    TOTAL HIP ARTHROPLASTY Right 11/21/2024    Dr. Vic Jarvis       Medications Prior to Admission:    Prior to Admission medications    Medication Sig Start Date End Date Taking? Authorizing Provider   oxyCODONE (ROXICODONE) 5 MG immediate release tablet Take 1 tablet by mouth every 6 hours as needed for Pain for up to 5 days. Intended supply: 5 days. Take lowest dose possible to manage pain Max Daily Amount: 20 mg 11/21/24 11/26/24 Yes Hui Sloan PA   apixaban (ELIQUIS) 2.5 MG TABS tablet Take 1 tablet by mouth 2 times daily 11/21/24  Yes Hui Sloan PA   naproxen sodium (ALEVE) 220 MG tablet Take 1 tablet by mouth daily as needed for Pain   Yes Provider, MD Kevin   diphenhydrAMINE-APAP, sleep, (TYLENOL PM

## 2024-11-21 NOTE — ANESTHESIA POSTPROCEDURE EVALUATION
Department of Anesthesiology  Postprocedure Note    Patient: Radha Gusman  MRN: [id number]  YOB: 1950  Date of evaluation: 11/21/2024    Procedure Summary       Date: 11/21/24 Room / Location: [address]    Anesthesia Start: 0734 Anesthesia Stop: 1003    Procedure: HIP TOTAL ARTHROPLASTY (Right: Hip) Diagnosis:       Right hip pain      (Right hip pain [M25.551])    Surgeons: Vic Jarvis MD Responsible Provider: Angelika Judd APRN - CRNA    Anesthesia Type: general ASA Status: 2            Anesthesia Type: No value filed.    Kaylin Phase I: Kaylin Score: 9    Kaylin Phase II:      Anesthesia Post Evaluation    Patient location during evaluation: bedside  Patient participation: complete - patient participated  Level of consciousness: responsive to verbal stimuli and sleepy but conscious  Pain score: 9  Airway patency: patent  Nausea & Vomiting: no nausea and no vomiting  Cardiovascular status: blood pressure returned to baseline and hemodynamically stable  Respiratory status: acceptable  Hydration status: euvolemic  Comments: Pt still having severe pain. It has been treated with IV medications. Pt states she is comfortable being moved to the floor to begin oral pain medications  Multimodal analgesia pain management approach  Pain management: adequate        No notable events documented.

## 2024-11-21 NOTE — CARE COORDINATION
Case Management Assessment  Initial Evaluation    Date/Time of Evaluation: 11/21/2024 1:11 PM  Assessment Completed by: Jorden Brantley RN    If patient is discharged prior to next notation, then this note serves as note for discharge by case management.    Patient Name: Radha De Jesus                   YOB: 1950  Diagnosis: Right hip pain [M25.551]  S/P total right hip arthroplasty [Z96.641]                   Date / Time: 11/21/2024  6:08 AM    Patient Admission Status: Observation   Readmission Risk (Low < 19, Mod (19-27), High > 27): No data recorded  Current PCP: Humza Monteiro APRN - CNP  PCP verified by CM? (P) Yes    Chart Reviewed: Yes      History Provided by: (P) Patient  Patient Orientation: (P) Alert and Oriented, Person, Place, Situation    Patient Cognition: (P) Alert    Hospitalization in the last 30 days (Readmission):  No    If yes, Readmission Assessment in  Navigator will be completed.    Advance Directives:      Code Status: Full Code   Patient's Primary Decision Maker is: (P) Legal Next of Kin    Primary Decision Maker: SILVANO DE JESUS - Spouse - [phone]    Discharge Planning:    Patient lives with: (P) Spouse/Significant Other Type of Home: (P) House  Primary Care Giver: (P) Self  Patient Support Systems include: (P) Spouse/Significant Other, Friends/Neighbors   Current Financial resources: (P) Medicare  Current community resources: (P) None  Current services prior to admission: (P) Durable Medical Equipment            Current DME: (P) Home Aerosol, Walker, Shower Chair            Type of Home Care services:  (P) None    ADLS  Prior functional level: (P) Independent in ADLs/IADLs  Current functional level: (P) Assistance with the following:, Cooking, Housework, Shopping    PT AM-PAC:   /24  OT AM-PAC:   /24    Family can provide assistance at DC: (P) Yes  Would you like Case Management to discuss the discharge plan with any other family members/significant others, and if so,  who? (P) No  Plans to Return to Present Housing: (P) Yes  Other Identified Issues/Barriers to RETURNING to current housing: none.  Potential Assistance needed at discharge: (P) N/A            Potential DME:    Patient expects to discharge to: (P) House  Plan for transportation at discharge: (P) Family    Financial    Payor: [facility] MEDICARE / Plan: ANTHEM DUAL ADVANTAGE / Product Type: *No Product type* /     Does insurance require precert for SNF: Yes    Potential assistance Purchasing Medications: (P) No  Meds-to-Beds request:        [facility] [address] [facility] -  [phone] - F [phone]  [address] [facility] [address]  Phone: [phone] Fax: [phone]    [facility] [address] -  [phone] - F [phone]  [address] [facility] [address]  Phone: [phone] Fax: [phone]      Notes:    Factors facilitating achievement of predicted outcomes: Family support, Caregiver support, Friend support, Motivated, Cooperative, Pleasant, Sense of humor, Good insight into deficits, Has needed Durable Medical Equipment at home.    Barriers to discharge: Pain, Decreased endurance, Lower extremity weakness, Wound Care, and Medication managment    Additional Case Management Notes: Patient and  interviewed to discuss discharge planning. Patient lives with her . She does not need to go up and down steps within the house. She is normally independent in ADL's however, she does have a walker and shower chair for use post-op. She uses a nebulizer for chronic COPD. She denies food or housing insecurity and medications are not an issue for them.   The plan for discharge is home with outpatient PT/OT in [place] at [facility] starting Monday. They do not foresee any additional needs at this time.    The Plan for Transition of Care is related to the following treatment goals of Right hip pain

## 2024-11-21 NOTE — ANESTHESIA PRE PROCEDURE
(chronic obstructive pulmonary disease) (HCC)     Depression     Essential tremor     Hemoglobin A1c above reference range     HLD (hyperlipidemia)     Hypertension     Liver mass     Nicotine dependence     Ophthalmic migraine        Past Surgical History:        Procedure Laterality Date    APPENDECTOMY      BREAST SURGERY      left breast lumpectomy    HYSTERECTOMY, VAGINAL      PAIN MANAGEMENT PROCEDURE Left 12/1/2021    LEFT TFESI L3 L4 L4 L5 WITH IV SEDATION performed by Darnell Pichardo MD at [facility]       Social History:    Social History     Tobacco Use    Smoking status: Every Day     Current packs/day: 0.50     Average packs/day: 0.5 packs/day for 1 year (0.5 ttl pk-yrs)     Types: Cigarettes     Passive exposure: Past    Smokeless tobacco: Never   Substance Use Topics    Alcohol use: Yes     Comment: 2 beers/month                                Ready to quit: Not Answered  Counseling given: Not Answered      Vital Signs (Current):   Vitals:    11/21/24 0630 11/21/24 0642   BP:  (!) 164/65   Pulse:  61   Resp:  15   Temp:  36.4 °C (97.5 °F)   TempSrc:  Temporal   SpO2:  93%   Weight: 62.8 kg (138 lb 6.4 oz)    Height: 1.549 m (5' 1\")                                               BP Readings from Last 3 Encounters:   11/21/24 (!) 164/65   10/03/24 (!) 153/81   08/26/24 137/81       NPO Status: Time of last liquid consumption: 0400                        Time of last solid consumption: 1630                        Date of last liquid consumption: 11/21/24                        Date of last solid food consumption: 11/20/24    BMI:   Wt Readings from Last 3 Encounters:   11/21/24 62.8 kg (138 lb 6.4 oz)   10/03/24 64.6 kg (142 lb 8 oz)   08/26/24 64 kg (141 lb)     Body mass index is 26.15 kg/m².    CBC:   Lab Results   Component Value Date/Time    WBC 11.8 08/22/2024 02:25 PM    RBC 4.83 08/22/2024 02:25 PM    RBC 3.82 01/27/2024 05:10 AM    HGB 14.8 08/22/2024 02:25 PM    HCT 45.1 08/22/2024 02:25 PM    MCV

## 2024-11-22 ENCOUNTER — APPOINTMENT (OUTPATIENT)
Dept: GENERAL RADIOLOGY | Age: 74
End: 2024-11-22
Attending: ORTHOPAEDIC SURGERY
Payer: MEDICARE

## 2024-11-22 VITALS
DIASTOLIC BLOOD PRESSURE: 62 MMHG | BODY MASS INDEX: 26.91 KG/M2 | HEIGHT: 61 IN | RESPIRATION RATE: 18 BRPM | SYSTOLIC BLOOD PRESSURE: 124 MMHG | HEART RATE: 73 BPM | WEIGHT: 142.5 LBS | OXYGEN SATURATION: 92 % | TEMPERATURE: 98.5 F

## 2024-11-22 LAB
ANION GAP SERPL CALCULATED.3IONS-SCNC: 8 MMOL/L (ref 9–16)
BUN SERPL-MCNC: 16 MG/DL (ref 8–23)
BUN/CREAT SERPL: 23 (ref 9–20)
CALCIUM SERPL-MCNC: 8.2 MG/DL (ref 8.6–10.4)
CHLORIDE SERPL-SCNC: 108 MMOL/L (ref 98–107)
CO2 SERPL-SCNC: 24 MMOL/L (ref 20–31)
CREAT SERPL-MCNC: 0.7 MG/DL (ref 0.5–0.9)
GFR, ESTIMATED: 85 ML/MIN/1.73M2
GLUCOSE SERPL-MCNC: 116 MG/DL (ref 74–99)
HCT VFR BLD AUTO: 36.3 % (ref 36.3–47.1)
HGB BLD-MCNC: 11.5 G/DL (ref 11.9–15.1)
POTASSIUM SERPL-SCNC: 4.6 MMOL/L (ref 3.7–5.3)
SODIUM SERPL-SCNC: 140 MMOL/L (ref 136–145)

## 2024-11-22 PROCEDURE — G0378 HOSPITAL OBSERVATION PER HR: HCPCS

## 2024-11-22 PROCEDURE — 94761 N-INVAS EAR/PLS OXIMETRY MLT: CPT

## 2024-11-22 PROCEDURE — 97535 SELF CARE MNGMENT TRAINING: CPT

## 2024-11-22 PROCEDURE — 2580000003 HC RX 258: Performed by: PHYSICIAN ASSISTANT

## 2024-11-22 PROCEDURE — 36415 COLL VENOUS BLD VENIPUNCTURE: CPT

## 2024-11-22 PROCEDURE — 6370000000 HC RX 637 (ALT 250 FOR IP): Performed by: PHYSICIAN ASSISTANT

## 2024-11-22 PROCEDURE — 97116 GAIT TRAINING THERAPY: CPT

## 2024-11-22 PROCEDURE — 85014 HEMATOCRIT: CPT

## 2024-11-22 PROCEDURE — 97110 THERAPEUTIC EXERCISES: CPT

## 2024-11-22 PROCEDURE — 80048 BASIC METABOLIC PNL TOTAL CA: CPT

## 2024-11-22 PROCEDURE — 6360000002 HC RX W HCPCS: Performed by: PHYSICIAN ASSISTANT

## 2024-11-22 PROCEDURE — 73610 X-RAY EXAM OF ANKLE: CPT

## 2024-11-22 PROCEDURE — 85018 HEMOGLOBIN: CPT

## 2024-11-22 RX ADMIN — VENLAFAXINE HYDROCHLORIDE 150 MG: 150 CAPSULE, EXTENDED RELEASE ORAL at 08:45

## 2024-11-22 RX ADMIN — GABAPENTIN 300 MG: 300 CAPSULE ORAL at 08:48

## 2024-11-22 RX ADMIN — ATENOLOL 100 MG: 50 TABLET ORAL at 08:44

## 2024-11-22 RX ADMIN — Medication 2000 MG: at 08:43

## 2024-11-22 RX ADMIN — POTASSIUM CHLORIDE 10 MEQ: 750 TABLET, EXTENDED RELEASE ORAL at 08:44

## 2024-11-22 RX ADMIN — ACETAMINOPHEN 650 MG: 325 TABLET ORAL at 01:33

## 2024-11-22 RX ADMIN — LISINOPRIL 30 MG: 20 TABLET ORAL at 08:45

## 2024-11-22 RX ADMIN — APIXABAN 2.5 MG: 2.5 TABLET, FILM COATED ORAL at 08:45

## 2024-11-22 RX ADMIN — AMLODIPINE BESYLATE 5 MG: 5 TABLET ORAL at 08:44

## 2024-11-22 RX ADMIN — ACETAMINOPHEN 650 MG: 325 TABLET ORAL at 06:56

## 2024-11-22 RX ADMIN — OXYCODONE HYDROCHLORIDE 10 MG: 5 TABLET ORAL at 07:08

## 2024-11-22 RX ADMIN — SODIUM CHLORIDE, PRESERVATIVE FREE 10 ML: 5 INJECTION INTRAVENOUS at 08:45

## 2024-11-22 ASSESSMENT — PAIN - FUNCTIONAL ASSESSMENT
PAIN_FUNCTIONAL_ASSESSMENT: ACTIVITIES ARE NOT PREVENTED

## 2024-11-22 ASSESSMENT — PAIN DESCRIPTION - ORIENTATION
ORIENTATION: RIGHT

## 2024-11-22 ASSESSMENT — PAIN DESCRIPTION - LOCATION
LOCATION: HIP

## 2024-11-22 ASSESSMENT — PAIN DESCRIPTION - PAIN TYPE
TYPE: SURGICAL PAIN

## 2024-11-22 ASSESSMENT — PAIN SCALES - GENERAL
PAINLEVEL_OUTOF10: 4
PAINLEVEL_OUTOF10: 3
PAINLEVEL_OUTOF10: 3
PAINLEVEL_OUTOF10: 9

## 2024-11-22 ASSESSMENT — PAIN DESCRIPTION - DESCRIPTORS
DESCRIPTORS: ACHING

## 2024-11-22 ASSESSMENT — PAIN DESCRIPTION - ONSET
ONSET: ON-GOING

## 2024-11-22 ASSESSMENT — PAIN DESCRIPTION - FREQUENCY
FREQUENCY: CONTINUOUS

## 2024-11-22 NOTE — PROGRESS NOTES
Progress Note    Subjective:     Post-Operative Day: 1 Status Post right Total Hip Arthroplasty    Patient ambulating back to bed from bathroom with assistance from nursing. Urinating without difficulty. Denies chest pain, shortness of breath, nausea, lightheadedness, dizziness, calf pain, and numbness/tingling in his toes and feet.    Objective:   VITALS:  /62   Pulse 73   Temp 98.5 °F (36.9 °C) (Temporal)   Resp 18   Ht 1.549 m (5' 1\")   Wt 64.6 kg (142 lb 8 oz)   SpO2 92%   BMI 26.93 kg/m²   24HR INTAKE/OUTPUT:    Intake/Output Summary (Last 24 hours) at 2024 0722  Last data filed at 2024 0513  Gross per 24 hour   Intake 3523.68 ml   Output 550 ml   Net 2973.68 ml     TEMPERATURE:  Current - Temp: 98.5 °F (36.9 °C); Max - Temp  Av.9 °F (36.6 °C)  Min: 97.4 °F (36.3 °C)  Max: 98.5 °F (36.9 °C)  RESPIRATIONS RANGE: Resp  Av.1  Min: 14  Max: 22  PULSE RANGE: Pulse  Av.9  Min: 59  Max: 74  BLOOD PRESSURE RANGE:  Systolic (24hrs), Av , Min:118 , Max:161   ; Diastolic (24hrs), Av, Min:43, Max:69    PULSE OXIMETRY RANGE: SpO2  Av.6 %  Min: 90 %  Max: 97 %    General: alert, appears stated age, cooperative, and no distress   Wound: Wound clean and dry no evidence of infection., No Erythema, and No Drainage   DVT Exam: No evidence of DVT seen on physical exam.  Negative Sawyer's sign.  No cords or calf tenderness.  No significant calf/ankle edema.     Mild edema to thigh. Thigh remains soft and compressible. Motor intact quad, hamstring, TA, GSC, EHL, FHL. SILT SPN, DPN, sural, saphenous, tibial nerve distribution. 2+ DP pulse. Toes warm and well perfused.      Data Review  CBC:   Lab Results   Component Value Date/Time    WBC 11.8 2024 02:25 PM    RBC 4.83 2024 02:25 PM    RBC 3.82 2024 05:10 AM    HGB 11.5 2024 05:58 AM    HCT 36.3 2024 05:58 AM     2024 02:25 PM    INR 1.00 2024 02:25 PM     BMP:   Lab Results

## 2024-11-22 NOTE — PROGRESS NOTES
Physical Therapy  Facility/Department: [facility] MED SURG  Daily Treatment Note  NAME: Radha Gusman  : 1950  MRN: [id number]    Date of Service: 2024    Discharge Recommendations:  Continue to assess pending progress, Outpatient PT, Home with assist PRN     Patient Diagnosis(es): The primary encounter diagnosis was S/P total right hip arthroplasty. A diagnosis of Pre-op testing was also pertinent to this visit.    Assessment  Assessment: Pt. ambulated 25ftx2 with FWW and CGA for safety. Demoes slow cadance and decreased R step length, step-to gait pattern noted. Transfers:CGA. Bed mobility:SBA/CGA. Pt. needing Tess with R LE when performing sit <-> supine transfer. Pt. completed stair training of 4 steps with CGA and B HR use--good carryover noted. Pt. stated that she has been able to use the walker for stairs as she used it prior to surgery. Reviewed and completed R LE therex to complete as well as hip precautions. Educated pt. on icing and elevating--no further questions or concerns at this time.  Activity Tolerance: Patient tolerated treatment well    Plan  Physical Therapy Plan  General Plan: 2 times a day 7 days a week  Specific Instructions for Next Treatment: Once daily on weekends  Current Treatment Recommendations: Strengthening;ROM;Balance training;Functional mobility training;Transfer training;Neuromuscular re-education;Gait training;Stair training;Home exercise program;Safety education & training;Patient/Caregiver education & training;Manual;Therapeutic activities;Endurance training    Restrictions  Restrictions/Precautions  Restrictions/Precautions: Fall Risk, General Precautions, Weight Bearing  Lower Extremity Weight Bearing Restrictions  Right Lower Extremity Weight Bearing: Weight Bearing As Tolerated     Subjective   Subjective  Subjective: Pt. in bed upon arrival, agreeable to therapy at this time  Pain: 7/10 R hip pain, complaints of R foot pain and getting an  x-ray.  Orientation  Overall Orientation Status: Within Functional Limits    Objective  Bed Mobility Training  Bed Mobility Training: Yes  Overall Level of Assistance: Contact-guard assistance;Assist X1;Stand-by assistance (Tess with R LE)  Interventions: Safety awareness training;Verbal cues  Rolling: Stand-by assistance  Supine to Sit: Stand-by assistance;Assist X1  Sit to Supine: Contact-guard assistance;Assist X1  Scooting: Stand-by assistance  Transfer Training  Transfer Training: Yes  Overall Level of Assistance: Contact-guard assistance;Assist X1  Interventions: Verbal cues;Safety awareness training  Sit to Stand: Contact-guard assistance;Assist X1  Stand to Sit: Contact-guard assistance;Assist X1  Gait  Gait Training: Yes  Overall Level of Assistance: Contact-guard assistance;Assist X1  Distance (ft): 50 Feet  Assistive Device: Walker, rolling;Gait belt  Interventions: Safety awareness training;Verbal cues  Speed/Jackelyn: Slow  Step Length: Right shortened  Rail Use: Both  Stairs - Level of Assistance: Contact-guard assistance;Assist X1 (Pt. able to use walker to complete stairs--had been using walker prior to surgery)  Number of Stairs Trained: 4  PT Exercises  Exercise Treatment: Reviewed and completed R LE therex  Safety Devices  Type of Devices: All fall risk precautions in place;Nurse notified;Patient at risk for falls;All merlin prominences offloaded;Call light within reach;Left in bed (Pt. getting an x-ray after therapy session)      Goals  Short Term Goals  Time Frame for Short Term Goals: 20 days  Short Term Goal 1: Patient to complete all transfers with SUP and no LOB to decrease fall risk.  Short Term Goal 2: Patient to ambulate 150ft with FWW and SUP with no LOB or fatigue for improved mobility.  Short Term Goal 3: Patient to ascend/descend 2 steps with HRx1 and CGA with no LOB to safely enter her home.  Short Term Goal 4: Patient to tolerate 20-30 min of ther ex/act for improved functional

## 2024-11-22 NOTE — CARE COORDINATION
11/22/24 0951   IMM Letter   Observation Status Letter date given: 11/22/24   Observation Status Letter time given: 0951   Observation Status Letter given to Patient/Family/Significant other/Guardian/POA/by: To patient/KURT Fuentes case manager.

## 2024-11-22 NOTE — PROGRESS NOTES
Pt sitting up in chair watching TV. Assessment and vital signs as charted. Pt is A & O x 4. Pt denies pain at this time. Pt is on 1.5 L nasal canula. Pulse ox WNL. IV fluids infusing as ordered. Right hip surgical dressing is clean, dry and intact. Pt denies any other needs. Call light in reach. Chair alarm on. Will continue to monitor.

## 2024-11-22 NOTE — PROGRESS NOTES
Writer at bedside at this time to perform shift assessment. Patient is lying in bed at this time. Vital signs taken and assessment completed at this time. Patient is alert and oriented and has complaint of right hip surgical pain 9/10; Scheduled tylenol given at this time and PRN Roxicodone given. Patient assisted to the bathroom and back to bed at this time. Patient denies any further needs at this time. Call light within reach, bed alarm on for safety, care ongoing.

## 2024-11-22 NOTE — CONSULTS
Tiara Boston, APRN-CNP -- Hospitalist  Progress Note 11/22/24    SUBJECTIVE:    Patient seen for f/u of post op medical management of HTN, COPD.   She is POD # 1 s/p right THR.  Her pain is well controlled.  She denies nausea and vomiting.  Last BM was prior to surgery.  She denies any chest pain, palpitations or shortness of breath.  She is doing well with PT. She complains of right ankle pain    ROS:   Constitutional: negative  for fevers, and negative for chills.  Respiratory: negative for shortness of breath, negative for cough, and negative for wheezing  Cardiovascular: negative for chest pain, and negative for palpitations  Gastrointestinal: negative for abdominal pain, negative for nausea,negative for vomiting, negative for diarrhea, and negative for constipation    OBJECTIVE:    Vitals:   Temp: 98.5 °F (36.9 °C)  BP: 124/62  Respirations: 18  Pulse: 73  SpO2: 92 %    24HR INTAKE/OUTPUT:    Intake/Output Summary (Last 24 hours) at 11/22/2024 0824  Last data filed at 11/22/2024 0513  Gross per 24 hour   Intake 3523.68 ml   Output 550 ml   Net 2973.68 ml      -----------------------------------------------------------------    Exam:  GEN:    Awake, alert and oriented x3.   EYES:  EOMI, pupils equal   NECK: Supple. No lymphadenopathy.  No carotid bruit  CVS:    regular rate and rhythm, no audible murmur  PULM:  CTA, no wheezes, rales or rhonchi, no acute respiratory distress  ABD:    Bowels sounds normal.  Abdomen is soft.  No distention.  no tenderness to palpation.   EXT:   no edema bilaterally .  No calf tenderness.   NEURO: Moves all extremities.  Motor and sensory are grossly intact   SKIN:  Incision is dressed with minimal drainage.     Diagnostic Data:  Lab Results   Component Value Date    HGB 11.5 (L) 11/22/2024      Lab Results   Component Value Date    GLUCOSE 116 (H) 11/22/2024    BUN 16 11/22/2024    CREATININE 0.7 11/22/2024     11/22/2024    K 4.6 11/22/2024    CALCIUM 8.2 (L) 11/22/2024

## 2024-11-22 NOTE — PROGRESS NOTES
Pt resting in bed quietly. Assessment and vital signs as charted. Pt is A & O x 4. IV fluids infusing as ordered. Right surgical hip dressing remains clean, dry and intact. Pt requesting pain medication. Bed alarm on. Call light in reach. Will continue to monitor.

## 2024-11-22 NOTE — PLAN OF CARE
Problem: Discharge Planning  Goal: Discharge to home or other facility with appropriate resources  Outcome: Completed     Problem: Pain  Goal: Verbalizes/displays adequate comfort level or baseline comfort level  11/22/2024 0958 by Yolanda Cowan RN  Outcome: Completed     Problem: Safety - Adult  Goal: Free from fall injury  11/22/2024 0958 by Yolanda Cowan, RN  Outcome: Completed     Problem: ABCDS Injury Assessment  Goal: Absence of physical injury  Outcome: Completed     Problem: Musculoskeletal - Adult  Goal: Return mobility to safest level of function  Outcome: Completed     Problem: Infection - Adult  Goal: Absence of infection during hospitalization  Outcome: Completed

## 2024-11-22 NOTE — DISCHARGE SUMMARY
their current diet restrictions and will continue to follow the precautions outlined to them by us and PT/OT.       Consults:     IP CONSULT TO HOSPITALIST    Disposition: Home  Condition at Discharge: Stable    Code Status:  Full Code     Patient Instructions:    Discharge lab work: n/a  Activity: ambulate in house, no heavy lifting, pushing, pulling with the implant side for 2 months, and no driving while on analgesics  Diet: ADULT DIET; Regular      Follow-up visits:   Vic Jarvis MD  [facility] [address]  [phone]    Go on 12/6/2024  @ 09:50, follow up visit       Follow up with Dr. Jarvis in office in 2 weeks.  Patient was instructed on the use of pain medications, the signs and symptoms of infection, and was given our number to call should they have any questions or concerns following discharge.      Discharge Medications:        Medication List        START taking these medications      apixaban 2.5 MG Tabs tablet  Commonly known as: Eliquis  Take 1 tablet by mouth 2 times daily     oxyCODONE 5 MG immediate release tablet  Commonly known as: Roxicodone  Take 1 tablet by mouth every 6 hours as needed for Pain for up to 5 days. Intended supply: 5 days. Take lowest dose possible to manage pain Max Daily Amount: 20 mg            CONTINUE taking these medications      albuterol sulfate  (90 Base) MCG/ACT inhaler  Commonly known as: Ventolin HFA  Inhale 2 puffs into the lungs every 6 hours as needed for Wheezing     alendronate 70 MG tablet  Commonly known as: FOSAMAX  Take 1 tablet by mouth every 7 days     Aleve 220 MG tablet  Generic drug: naproxen sodium     amLODIPine 5 MG tablet  Commonly known as: NORVASC  TAKE ONE TABLET BY MOUTH DAILY     * atenolol 50 MG tablet  Commonly known as: TENORMIN  Take 1 tablet by mouth nightly     * atenolol 100 MG tablet  Commonly known as: TENORMIN  Take 1 tablet by mouth daily     calcium carbonate 500 MG Tabs tablet  Commonly known as: OSCAL

## 2024-11-22 NOTE — PLAN OF CARE
Problem: Pain  Goal: Verbalizes/displays adequate comfort level or baseline comfort level  Outcome: Progressing  Flowsheets (Taken 11/22/2024 0406)  Verbalizes/displays adequate comfort level or baseline comfort level: Assess pain using appropriate pain scale  Note: Pain meds given as needed & as ordered. Will continue to assess.     Problem: Safety - Adult  Goal: Free from fall injury  Outcome: Progressing  Flowsheets (Taken 11/22/2024 0406)  Free From Fall Injury: Instruct family/caregiver on patient safety  Note: Pt is at high risk for falls. Non skid socks on. Bed in low position and wheels locked. Fall sign posted and bracelet on. Siderails up x 2. Bed alarm on. Call light within reach. Will continue to assess.

## 2024-11-22 NOTE — PROGRESS NOTES
Occupational Therapy  Facility/Department: [facility] MED SURG  Daily Treatment Note  NAME: Radha Gusman  : 1950  MRN: [id number]    Date of Service: 2024    Discharge Recommendations:  Continue to assess pending progress         Patient Diagnosis(es): The primary encounter diagnosis was S/P total right hip arthroplasty. A diagnosis of Pre-op testing was also pertinent to this visit.     Assessment   Activity Tolerance: Patient tolerated treatment well  Discharge Recommendations: Continue to assess pending progress     Plan  Occupational Therapy Plan  Times Per Day: Once a day  Days Per Week: 7 Days  Current Treatment Recommendations: Strengthening;Balance training;Functional mobility training;Endurance training;Safety education & training;Patient/Caregiver education & training;Equipment evaluation, education, & procurement;Self-Care / ADL    Restrictions   Contact, fall risk    Subjective  Subjective  Subjective: Pt in bed,a greed to washing up sinkside and dressing for d/c.  Pain: Min post surgical pain at rest, slight increase with movement/WBing  Orientation  Overall Orientation Status: Within Functional Limits          Objective  Vitals       ADL  Grooming: Setup;Stand by assistance  Grooming Skilled Clinical Factors: seated  UE Bathing: Setup;Stand by assistance  UE Bathing Skilled Clinical Factors: seated  LE Bathing: Moderate assistance  UE Dressing: Stand by assistance;Setup  LE Dressing: Moderate assistance  Putting On/Taking Off Footwear: Maximum assistance  Toileting: Stand by assistance;Contact guard assistance  Functional Mobility: Stand by assistance;Contact guard assistance  Functional Mobility Skilled Clinical Factors: FWW  Additional Comments: Min A to manage BLE during bed mob, SBA sit<>stands  Skin Care: Soap and water        Safety Devices  Type of Devices: All fall risk precautions in place;Call light within reach;Chair alarm in place;Left in chair    Patient Education  Education Given

## (undated) DEVICE — SOLUTION SCRB 4OZ 4% CHG H2O AIDED FOR PREOPERATIVE SKIN

## (undated) DEVICE — SUTURE ETHIBOND EXCEL SZ 5 L30IN NONABSORBABLE GRN L40MM V-37 MB66G

## (undated) DEVICE — DRAPE,U/ SHT,SPLIT,PLAS,STERIL: Brand: MEDLINE

## (undated) DEVICE — Device

## (undated) DEVICE — MARKER,SKIN,WI/RULER AND LABELS: Brand: MEDLINE

## (undated) DEVICE — SYRINGE 20ML LL S/C 50

## (undated) DEVICE — GOWN,REINF,POLY,AURORA,XLNG/XL,STRL: Brand: MEDLINE

## (undated) DEVICE — SUTURE ABSORBABLE MONOFILAMENT 1 CTX 36 CM 48 MM VIO PDS +

## (undated) DEVICE — 450 ML BOTTLE OF 0.05% CHLORHEXIDINE GLUCONATE IN 99.95% STERILE WATER FOR IRRIGATION, USP AND APPLICATOR.: Brand: IRRISEPT ANTIMICROBIAL WOUND LAVAGE

## (undated) DEVICE — SUTURE STRATAFIX SZ 3-0 30CM NONABSORB UD 26MM FS 3/8 SXMP2B412

## (undated) DEVICE — SUTURE PDS II SZ 1 L36IN ABSRB VLT L48MM CTX 1/2 CIR Z371T

## (undated) DEVICE — ULTRACLEAN ACCESSORY ELECTRODE 4" (10.16 CM) COATED BLADE: Brand: ULTRACLEAN

## (undated) DEVICE — GLOVE ORANGE PI 7 1/2   MSG9075

## (undated) DEVICE — UNDERGLOVE BIOGEL ULTRATOUCH S INDICATOR SZ 8

## (undated) DEVICE — GLOVE SURG SZ 75 L12IN FNGR THK79MIL GRN LTX FREE

## (undated) DEVICE — 3M™ IOBAN™ 2 ANTIMICROBIAL INCISE DRAPE 6648EZ: Brand: IOBAN™ 2

## (undated) DEVICE — ADHESIVE SKIN CLOSURE WND 8.661X1.5 IN 22 CM LIQUIBAND SECUR

## (undated) DEVICE — BIT DRL L20MM DIA3.2MM DISP FOR G7 2 MOBILITY CONSTRUCT

## (undated) DEVICE — SUTURE MONOCRYL SZ 2-0 L27IN ABSRB UD CP-1 1 L36MM 1/2 CIR REV Y266H

## (undated) DEVICE — SPONGE LAP W18XL18IN WHT COT 4 PLY FLD STRUNG RADPQ DISP ST 2 PER PACK

## (undated) DEVICE — COVER,MAYO STAND,STERILE: Brand: MEDLINE

## (undated) DEVICE — APPLICATOR MEDICATED 26 CC SOLUTION HI LT ORNG CHLORAPREP

## (undated) DEVICE — BANDAGE COMPR W6INXL15YD WHT BGE POLY COT WV E HK LOOP CLSR

## (undated) DEVICE — LIQUIBAND RAPID ADHESIVE 36/CS 0.8ML: Brand: MEDLINE

## (undated) DEVICE — CLEANER,CAUTERY TIP,2X2",STERILE: Brand: MEDLINE